# Patient Record
Sex: MALE | URBAN - METROPOLITAN AREA
[De-identification: names, ages, dates, MRNs, and addresses within clinical notes are randomized per-mention and may not be internally consistent; named-entity substitution may affect disease eponyms.]

---

## 2023-05-15 ENCOUNTER — APPOINTMENT (OUTPATIENT)
Dept: URBAN - METROPOLITAN AREA CLINIC 232 | Age: 43
Setting detail: DERMATOLOGY
End: 2023-05-15

## 2023-05-15 DIAGNOSIS — L82.0 INFLAMED SEBORRHEIC KERATOSIS: ICD-10-CM

## 2023-05-15 DIAGNOSIS — L72.0 EPIDERMAL CYST: ICD-10-CM

## 2023-05-15 PROCEDURE — 99202 OFFICE O/P NEW SF 15 MIN: CPT | Mod: 25

## 2023-05-15 PROCEDURE — OTHER LIQUID NITROGEN: OTHER

## 2023-05-15 PROCEDURE — OTHER DEFER: OTHER

## 2023-05-15 PROCEDURE — OTHER COUNSELING: OTHER

## 2023-05-15 PROCEDURE — 17110 DESTRUCT B9 LESION 1-14: CPT

## 2023-05-15 ASSESSMENT — LOCATION DETAILED DESCRIPTION DERM
LOCATION DETAILED: RIGHT MEDIAL TRAPEZIAL NECK
LOCATION DETAILED: RIGHT CENTRAL ZYGOMA

## 2023-05-15 ASSESSMENT — LOCATION SIMPLE DESCRIPTION DERM
LOCATION SIMPLE: RIGHT ZYGOMA
LOCATION SIMPLE: POSTERIOR NECK

## 2023-05-15 ASSESSMENT — LOCATION ZONE DERM
LOCATION ZONE: FACE
LOCATION ZONE: NECK

## 2023-05-15 NOTE — HPI: SKIN LESION
What Type Of Note Output Would You Prefer (Optional)?: Standard Output
How Severe Is Your Skin Lesion?: mild
Has Your Skin Lesion Been Treated?: not been treated
Is This A New Presentation, Or A Follow-Up?: Skin Lesion Referral
Who Is Your Referring Provider?: Dr. Jossue Man

## 2023-05-15 NOTE — PROCEDURE: DEFER
Procedure To Be Performed At Next Visit: Excision
X Size Of Lesion In Cm (Optional): 0
Detail Level: Detailed
Introduction Text (Please End With A Colon): The following procedure was deferred:
Instructions (Optional): Pt will schedule today or call to set up appointment for after softball season

## 2023-05-15 NOTE — PROCEDURE: LIQUID NITROGEN
Spray Paint Technique: Yes
Post-Care Instructions: I reviewed with the patient in detail post-care instructions. Patient is to wear sunprotection, and avoid picking at any of the treated lesions. Pt may apply Vaseline to crusted or scabbing areas.
Medical Necessity Clause: This procedure was medically necessary because the lesions that were treated were:
Medical Necessity Information: It is in your best interest to select a reason for this procedure from the list below. All of these items fulfill various CMS LCD requirements except the new and changing color options.
Duration Of Freeze Thaw-Cycle (Seconds): 5-10
Include Z78.9 (Other Specified Conditions Influencing Health Status) As An Associated Diagnosis?: No
Detail Level: Detailed
Consent: The patient's consent was obtained including but not limited to risks of crusting, scabbing, blistering, scarring, darker or lighter pigmentary change, recurrence, incomplete removal and infection.
Spray Paint Text: The liquid nitrogen was applied to the skin utilizing a spray paint frosting technique.
Number Of Freeze-Thaw Cycles: 3 freeze-thaw cycles

## 2023-09-18 ENCOUNTER — APPOINTMENT (OUTPATIENT)
Dept: URBAN - METROPOLITAN AREA CLINIC 232 | Age: 43
Setting detail: DERMATOLOGY
End: 2023-09-18

## 2023-09-18 DIAGNOSIS — D22 MELANOCYTIC NEVI: ICD-10-CM

## 2023-09-18 DIAGNOSIS — L72.0 EPIDERMAL CYST: ICD-10-CM

## 2023-09-18 PROBLEM — D22.39 MELANOCYTIC NEVI OF OTHER PARTS OF FACE: Status: ACTIVE | Noted: 2023-09-18

## 2023-09-18 PROBLEM — D22.4 MELANOCYTIC NEVI OF SCALP AND NECK: Status: ACTIVE | Noted: 2023-09-18

## 2023-09-18 PROCEDURE — 11306 SHAVE SKIN LESION 0.6-1.0 CM: CPT

## 2023-09-18 PROCEDURE — OTHER SHAVE REMOVAL: OTHER

## 2023-09-18 PROCEDURE — 11311 SHAVE SKIN LESION 0.6-1.0 CM: CPT

## 2023-09-18 PROCEDURE — OTHER EXCISION: OTHER

## 2023-09-18 PROCEDURE — 11311 SHAVE SKIN LESION 0.6-1.0 CM: CPT | Mod: 76

## 2023-09-18 PROCEDURE — 12041 INTMD RPR N-HF/GENIT 2.5CM/<: CPT | Mod: 59

## 2023-09-18 PROCEDURE — 11422 EXC H-F-NK-SP B9+MARG 1.1-2: CPT

## 2023-09-18 PROCEDURE — OTHER COUNSELING: OTHER

## 2023-09-18 PROCEDURE — 11306 SHAVE SKIN LESION 0.6-1.0 CM: CPT | Mod: 76

## 2023-09-18 PROCEDURE — OTHER MIPS QUALITY: OTHER

## 2023-09-18 ASSESSMENT — LOCATION DETAILED DESCRIPTION DERM
LOCATION DETAILED: RIGHT OCCIPITAL SCALP
LOCATION DETAILED: LEFT SUPERIOR MEDIAL MALAR CHEEK
LOCATION DETAILED: LEFT MEDIAL TRAPEZIAL NECK
LOCATION DETAILED: RIGHT INFERIOR OCCIPITAL SCALP
LOCATION DETAILED: RIGHT SUPERIOR MEDIAL MALAR CHEEK

## 2023-09-18 ASSESSMENT — LOCATION ZONE DERM
LOCATION ZONE: NECK
LOCATION ZONE: FACE
LOCATION ZONE: SCALP

## 2023-09-18 ASSESSMENT — LOCATION SIMPLE DESCRIPTION DERM
LOCATION SIMPLE: RIGHT CHEEK
LOCATION SIMPLE: SCALP
LOCATION SIMPLE: POSTERIOR SCALP
LOCATION SIMPLE: POSTERIOR NECK
LOCATION SIMPLE: LEFT CHEEK

## 2023-09-18 NOTE — PROCEDURE: SHAVE REMOVAL
Medical Necessity Information: It is in your best interest to select a reason for this procedure from the list below. All of these items fulfill various CMS LCD requirements except the new and changing color options.
Medical Necessity Clause: This procedure was medically necessary because the lesion that was treated was:
Lab: [id number]
Lab Facility: [id number]
Detail Level: Detailed
Was A Bandage Applied: Yes
Size Of Lesion In Cm (Required): 0.6
Depth Of Shave: dermis
Biopsy Method: Dermablade
Anesthesia Type: 1% lidocaine with epinephrine
Anesthesia Volume In Cc: 0.3
Hemostasis: Drysol
Wound Care: Petrolatum
Path Notes (To The Dermatopathologist): Check margins
Render Path Notes In Note?: No
Consent was obtained from the patient. The risks and benefits to therapy were discussed in detail. Specifically, the risks of infection, scarring, bleeding, prolonged wound healing, incomplete removal, allergy to anesthesia, nerve injury and recurrence were addressed. Prior to the procedure, the treatment site was clearly identified and confirmed by the patient. All components of Universal Protocol/PAUSE Rule completed.
Post-Care Instructions: I reviewed with the patient in detail post-care instructions. Patient is to keep the biopsy site dry overnight, and then apply bacitracin twice daily until healed. Patient may apply hydrogen peroxide soaks to remove any crusting.
Notification Instructions: Patient will be notified of pathology results. However, patient instructed to call the office if not contacted within 2 weeks.
Billing Type: Third-Party Bill
Anesthesia Volume In Cc: 0.4

## 2023-09-18 NOTE — PROCEDURE: EXCISION
